# Patient Record
Sex: MALE
[De-identification: names, ages, dates, MRNs, and addresses within clinical notes are randomized per-mention and may not be internally consistent; named-entity substitution may affect disease eponyms.]

---

## 2021-02-23 PROBLEM — Z00.00 ENCOUNTER FOR PREVENTIVE HEALTH EXAMINATION: Status: ACTIVE | Noted: 2021-02-23

## 2021-02-25 ENCOUNTER — APPOINTMENT (OUTPATIENT)
Dept: ORTHOPEDIC SURGERY | Facility: CLINIC | Age: 51
End: 2021-02-25
Payer: COMMERCIAL

## 2021-02-25 VITALS
DIASTOLIC BLOOD PRESSURE: 90 MMHG | BODY MASS INDEX: 28.25 KG/M2 | HEIGHT: 67 IN | HEART RATE: 86 BPM | TEMPERATURE: 97.9 F | WEIGHT: 180 LBS | SYSTOLIC BLOOD PRESSURE: 152 MMHG

## 2021-02-25 DIAGNOSIS — M18.11 UNILATERAL PRIMARY OSTEOARTHRITIS OF FIRST CARPOMETACARPAL JOINT, RIGHT HAND: ICD-10-CM

## 2021-02-25 DIAGNOSIS — Z78.9 OTHER SPECIFIED HEALTH STATUS: ICD-10-CM

## 2021-02-25 DIAGNOSIS — Z87.39 PERSONAL HISTORY OF OTHER DISEASES OF THE MUSCULOSKELETAL SYSTEM AND CONNECTIVE TISSUE: ICD-10-CM

## 2021-02-25 PROCEDURE — 73130 X-RAY EXAM OF HAND: CPT | Mod: RT

## 2021-02-25 PROCEDURE — 99072 ADDL SUPL MATRL&STAF TM PHE: CPT

## 2021-02-25 PROCEDURE — 99204 OFFICE O/P NEW MOD 45 MIN: CPT

## 2021-02-25 RX ORDER — ESOMEPRAZOLE MAGNESIUM 20 MG/1
TABLET ORAL
Refills: 0 | Status: ACTIVE | COMMUNITY

## 2021-02-25 NOTE — PHYSICAL EXAM
[de-identified] : - Constitutional: This is a male in no obvious distress.  \par - Psych: Patient is alert and oriented to person, place and time.  Patient has a normal mood and affect.\par - Cardiovascular: Normal pulses throughout the upper extremities.  No significant varicosities are noted in the upper extremities. \par - Neuro: Strength and sensation are intact throughout the upper extremities.  Patient has normal coordination.\par - Respiratory:  Patient exhibits no evidence of shortness of breath or difficulty breathing.\par - Skin: No rashes, lesions, or other abnormalities are noted in the upper extremities.\par \par ---\par \par Side: Right Thumb\par -There is swelling and tenderness along the basal join of the thumb.\par -There is a positive grind test.\par -There is no instability of the basal joint of the thumb.\par -There is no evidence of a trigger thumb.\par -There is no evidence of De Quervain's tendonitis.\par -Provocative signs for carpal tunnel syndrome are negative.\par -There is normal strength and sensation distally along the radial, ulnar and median nerve distributions. 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

## 2021-02-25 NOTE — ADDENDUM
[FreeTextEntry1] : I, Adele Gaspar, acted solely as a scribe for Dr. Mcbride on this date on 02/25/2021

## 2021-02-25 NOTE — HISTORY OF PRESENT ILLNESS
[Right] : right hand dominant [FreeTextEntry1] : He comes in today for evaluation of right thumb pain. He works for his construction company and plays golf consistently but he denies injury from playing golf. He has had at least a dozen injections over the last 3 years the last one in December 2020.  He states the injections relieve his symptoms less and less  each time. He rates his pain a 0/10 at rest and 12/10 with activity at this time. \par \par He was referred by his friend, Jose Goode.\par \par

## 2021-02-25 NOTE — DISCUSSION/SUMMARY
[FreeTextEntry1] : He has findings consistent with chronic right thumb pain secondary to advanced basal joint arthritis.  He has had multiple cortisone injections in the past.\par \par I had a discussion regarding today's visit, the diagnosis, and treatment recommendations / options.  I would not recommend a repeat cortisone injection today, as he had an injection only 2 or 3 months ago.  He does understand that he will eventually require surgery.  We discussed various procedures.  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  We did briefly discuss the procedure and expected recovery of 3 months.  He will consider this.  If he decides to proceed in the future, he will return to the office to discuss this further.\par \par The patient has agreed to this plan of management and has expressed full understanding.  All questions were fully answered to the patient's satisfaction.\par \par I spent at least 30 minutes in total on this patient's visit.  This included: Preparation for the visit, review of the medical records, review of pertinent diagnostic studies, examination and counseling of the patient on the above diagnosis, treatment plan and prognosis, orders of diagnostic tests, medications and/or appropriate procedures and documentation in the medical records of today's visit.